# Patient Record
Sex: FEMALE | Race: ASIAN | NOT HISPANIC OR LATINO | URBAN - METROPOLITAN AREA
[De-identification: names, ages, dates, MRNs, and addresses within clinical notes are randomized per-mention and may not be internally consistent; named-entity substitution may affect disease eponyms.]

---
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
## 2024-07-21 NOTE — ED PEDIATRIC NURSE NOTE - CHIEF COMPLAINT QUOTE
BIB EMS and [employer] because she locked herself in her bedroom for 30 minutes. Patient got agitated when the police arrived at her house and was brought to the hospital. Patient has a history of autism and is taking psychotropic medications. Patient arrived in 4 points restraints that were released on arrival to the  area.
Yes
